# Patient Record
Sex: FEMALE | Race: WHITE | NOT HISPANIC OR LATINO
[De-identification: names, ages, dates, MRNs, and addresses within clinical notes are randomized per-mention and may not be internally consistent; named-entity substitution may affect disease eponyms.]

---

## 2017-08-28 ENCOUNTER — APPOINTMENT (OUTPATIENT)
Dept: ENDOCRINOLOGY | Facility: CLINIC | Age: 62
End: 2017-08-28

## 2017-09-08 ENCOUNTER — RX RENEWAL (OUTPATIENT)
Age: 62
End: 2017-09-08

## 2017-10-20 ENCOUNTER — APPOINTMENT (OUTPATIENT)
Dept: ENDOCRINOLOGY | Facility: CLINIC | Age: 62
End: 2017-10-20
Payer: COMMERCIAL

## 2017-10-20 VITALS
HEART RATE: 73 BPM | DIASTOLIC BLOOD PRESSURE: 70 MMHG | SYSTOLIC BLOOD PRESSURE: 104 MMHG | OXYGEN SATURATION: 99 % | HEIGHT: 66 IN

## 2017-10-20 PROCEDURE — 99214 OFFICE O/P EST MOD 30 MIN: CPT

## 2017-10-20 RX ORDER — TOCOPHERSOLAN (VITAMIN E TPGS) 400/15ML
LIQUID (ML) ORAL
Refills: 0 | Status: ACTIVE | COMMUNITY

## 2017-10-20 RX ORDER — BESIFLOXACIN 6 MG/ML
0.6 SUSPENSION OPHTHALMIC
Qty: 5 | Refills: 0 | Status: COMPLETED | COMMUNITY
Start: 2017-04-24

## 2017-10-20 RX ORDER — PREDNISOLONE ACETATE 10 MG/ML
1 SUSPENSION/ DROPS OPHTHALMIC
Qty: 5 | Refills: 0 | Status: COMPLETED | COMMUNITY
Start: 2017-07-20

## 2017-12-11 ENCOUNTER — RX RENEWAL (OUTPATIENT)
Age: 62
End: 2017-12-11

## 2018-09-19 ENCOUNTER — TRANSCRIPTION ENCOUNTER (OUTPATIENT)
Age: 63
End: 2018-09-19

## 2018-09-26 ENCOUNTER — LABORATORY RESULT (OUTPATIENT)
Age: 63
End: 2018-09-26

## 2018-09-26 ENCOUNTER — APPOINTMENT (OUTPATIENT)
Dept: ENDOCRINOLOGY | Facility: CLINIC | Age: 63
End: 2018-09-26
Payer: COMMERCIAL

## 2018-09-26 VITALS
DIASTOLIC BLOOD PRESSURE: 70 MMHG | BODY MASS INDEX: 28.61 KG/M2 | HEART RATE: 80 BPM | WEIGHT: 178 LBS | OXYGEN SATURATION: 98 % | SYSTOLIC BLOOD PRESSURE: 104 MMHG | HEIGHT: 66 IN

## 2018-09-26 PROCEDURE — 99214 OFFICE O/P EST MOD 30 MIN: CPT

## 2018-09-27 LAB
ALBUMIN SERPL ELPH-MCNC: 4.3 G/DL
ALP BLD-CCNC: 51 U/L
ALT SERPL-CCNC: 14 U/L
ANION GAP SERPL CALC-SCNC: 12 MMOL/L
AST SERPL-CCNC: 19 U/L
BASOPHILS # BLD AUTO: 0.06 K/UL
BASOPHILS NFR BLD AUTO: 1 %
BILIRUB SERPL-MCNC: 0.4 MG/DL
BUN SERPL-MCNC: 15 MG/DL
CALCIUM SERPL-MCNC: 9.4 MG/DL
CHLORIDE SERPL-SCNC: 104 MMOL/L
CO2 SERPL-SCNC: 26 MMOL/L
CREAT SERPL-MCNC: 0.86 MG/DL
EOSINOPHIL # BLD AUTO: 0.21 K/UL
EOSINOPHIL NFR BLD AUTO: 3.5 %
GLUCOSE SERPL-MCNC: 107 MG/DL
HCT VFR BLD CALC: 40.4 %
HGB BLD-MCNC: 13.9 G/DL
IMM GRANULOCYTES NFR BLD AUTO: 0.2 %
LYMPHOCYTES # BLD AUTO: 2.24 K/UL
LYMPHOCYTES NFR BLD AUTO: 37.3 %
MAN DIFF?: NORMAL
MCHC RBC-ENTMCNC: 30.8 PG
MCHC RBC-ENTMCNC: 34.4 GM/DL
MCV RBC AUTO: 89.6 FL
MONOCYTES # BLD AUTO: 0.35 K/UL
MONOCYTES NFR BLD AUTO: 5.8 %
NEUTROPHILS # BLD AUTO: 3.13 K/UL
NEUTROPHILS NFR BLD AUTO: 52.2 %
PLATELET # BLD AUTO: 218 K/UL
POTASSIUM SERPL-SCNC: 4.3 MMOL/L
PROT SERPL-MCNC: 7.2 G/DL
RBC # BLD: 4.51 M/UL
RBC # FLD: 12.5 %
SODIUM SERPL-SCNC: 142 MMOL/L
T3RU NFR SERPL: 0.98 INDEX
T4 SERPL-MCNC: 7 UG/DL
TSH SERPL-ACNC: 1.34 UIU/ML
WBC # FLD AUTO: 6 K/UL

## 2018-10-16 ENCOUNTER — INBOUND DOCUMENT (OUTPATIENT)
Age: 63
End: 2018-10-16

## 2018-12-10 ENCOUNTER — RX RENEWAL (OUTPATIENT)
Age: 63
End: 2018-12-10

## 2019-09-22 ENCOUNTER — TRANSCRIPTION ENCOUNTER (OUTPATIENT)
Age: 64
End: 2019-09-22

## 2019-09-23 ENCOUNTER — RX RENEWAL (OUTPATIENT)
Age: 64
End: 2019-09-23

## 2019-09-25 ENCOUNTER — APPOINTMENT (OUTPATIENT)
Dept: ENDOCRINOLOGY | Facility: CLINIC | Age: 64
End: 2019-09-25
Payer: COMMERCIAL

## 2019-09-25 VITALS
DIASTOLIC BLOOD PRESSURE: 70 MMHG | HEART RATE: 88 BPM | WEIGHT: 169 LBS | BODY MASS INDEX: 27.16 KG/M2 | OXYGEN SATURATION: 98 % | SYSTOLIC BLOOD PRESSURE: 120 MMHG | HEIGHT: 66 IN

## 2019-09-25 DIAGNOSIS — E03.9 HYPOTHYROIDISM, UNSPECIFIED: ICD-10-CM

## 2019-09-25 DIAGNOSIS — Z79.811 LONG TERM (CURRENT) USE OF AROMATASE INHIBITORS: ICD-10-CM

## 2019-09-25 DIAGNOSIS — M85.80 OTHER SPECIFIED DISORDERS OF BONE DENSITY AND STRUCTURE, UNSPECIFIED SITE: ICD-10-CM

## 2019-09-25 PROCEDURE — 99214 OFFICE O/P EST MOD 30 MIN: CPT

## 2019-09-25 NOTE — HISTORY OF PRESENT ILLNESS
[FreeTextEntry1] : 64 yo female with hypothyroidism x 15 years presents for follow up\par Patient currently on LT4 112mcg qam, reports adherence however currently taking it with calcium and Vit D. Does not report fatigue, palpitation, tremors, nausea, vomiting, constipation, cold/heat intolerance.\par \par Currently had nasal congestion, cough and yellow mucus production. Does not report fever. Reports sick contact with son and son't girlfriend.  \par \par Breast Ca monitored at [facility] [place]. Had BMD at [facility] [place] 2016 spine 1.5, neck -1.2, hip -0.6\par Given Arimidex Rx but still did not start, may consider November. \par Stopped Tamoxifen last due to uterine changes. \par Mother  of colon ca; sched repeat colonoscopy [Calcium (supplements)] : calcium from a dietary supplement [Vitamin D (supplements)] : Vitamin D as a dietary supplement

## 2019-09-25 NOTE — ASSESSMENT
[FreeTextEntry1] : 64 y/o female with hypothyroidism presents for follow up\par \par \par breast ca considered EKATERINA\par h/o osteopenia. Never started Arimidex, she is still considering starting this (it has been ~ 1 year of this). If pt starts can discuss Prolia. Pt is having a routine BMD later this week at [facility] [place]. \par \par \par \par Of note, pt moved to [place], [place] and states she has been walking frequently. \par f/u in 1 year.

## 2019-09-25 NOTE — PHYSICAL EXAM
[Alert] : alert [No Acute Distress] : no acute distress [Well Nourished] : well nourished [Well Developed] : well developed [Normal Sclera/Conjunctiva] : normal sclera/conjunctiva [EOMI] : extra ocular movement intact [No Proptosis] : no proptosis [Normal Oropharynx] : the oropharynx was normal [Thyroid Not Enlarged] : the thyroid was not enlarged [No Thyroid Nodules] : there were no palpable thyroid nodules [No Respiratory Distress] : no respiratory distress [No Accessory Muscle Use] : no accessory muscle use [Clear to Auscultation] : lungs were clear to auscultation bilaterally [Normal Rate] : heart rate was normal  [Normal S1, S2] : normal S1 and S2 [Regular Rhythm] : with a regular rhythm [No Edema] : there was no peripheral edema [Normal Bowel Sounds] : normal bowel sounds [Not Tender] : non-tender [Soft] : abdomen soft [Not Distended] : not distended [Post Cervical Nodes] : posterior cervical nodes [Anterior Cervical Nodes] : anterior cervical nodes [Normal] : normal and non tender [No Spinal Tenderness] : no spinal tenderness [Spine Straight] : spine straight [No Stigmata of Cushings Syndrome] : no stigmata of cushings syndrome [Normal Gait] : normal gait [No Rash] : no rash [Normal Strength/Tone] : muscle strength and tone were normal [Acanthosis Nigricans] : no acanthosis nigricans [Normal Reflexes] : deep tendon reflexes were 2+ and symmetric [No Tremors] : no tremors [Oriented x3] : oriented to person, place, and time

## 2019-09-26 LAB
ALBUMIN SERPL ELPH-MCNC: 4.7 G/DL
ALP BLD-CCNC: 68 U/L
ALT SERPL-CCNC: 15 U/L
ANION GAP SERPL CALC-SCNC: 15 MMOL/L
AST SERPL-CCNC: 22 U/L
BILIRUB SERPL-MCNC: 0.5 MG/DL
BUN SERPL-MCNC: 18 MG/DL
CALCIUM SERPL-MCNC: 9.6 MG/DL
CHLORIDE SERPL-SCNC: 103 MMOL/L
CO2 SERPL-SCNC: 22 MMOL/L
CREAT SERPL-MCNC: 0.78 MG/DL
GLUCOSE SERPL-MCNC: 94 MG/DL
POTASSIUM SERPL-SCNC: 4.9 MMOL/L
PROT SERPL-MCNC: 7.1 G/DL
SODIUM SERPL-SCNC: 140 MMOL/L
T4 FREE SERPL-MCNC: 1.5 NG/DL
TSH SERPL-ACNC: 1.2 UIU/ML

## 2019-09-27 PROBLEM — M85.80 OSTEOPENIA: Status: ACTIVE | Noted: 2017-10-20

## 2019-09-27 NOTE — PROCEDURE
[FreeTextEntry1] : 9/28/18 BMD\par Indication: h/o breast cancer \par Spine T score -1.9, osteopenia  ( -4.7%)\par L fem Neck: -0.7, normal  (+2.8%)\par L total hip: -0.6 normal ( -0.2 %)\par \par

## 2019-09-27 NOTE — ASSESSMENT
[FreeTextEntry1] : 64 yo female presents for follow up of hypothyroidism. \par \par 1.Hypothyroidism:\par Currently clinically and biochemically euthyroid \par 2018 TSH 1.34\par Will obtain TFT and CMP today\par Continue LT4 112mcg qam on empty stomach. All medications should be taken separate from Synthroid, calcium supplements at least 4hours apart. \par \par \par 2. h/o osteopenia in spine\par - Discontinued Arimidex after short course the beginning of this year due to side effects\par - 2018 BMD (performed at [facility] [place])  showed improvement in fem neck however worsening in spine compared with 2016 \par - At this time would not start treatment for osteopenia \par \par \par Of note, pt moved to [place], [place], will continue to travel for annual visit \par \par RTC in 1 year  [Levothyroxine] : The patient was instructed to take Levothyroxine on an empty stomach, separate from vitamins, and wait at least 30 minutes before eating

## 2019-09-27 NOTE — PHYSICAL EXAM
[Alert] : alert [No Acute Distress] : no acute distress [Well Nourished] : well nourished [Well Developed] : well developed [Normal Sclera/Conjunctiva] : normal sclera/conjunctiva [No Proptosis] : no proptosis [EOMI] : extra ocular movement intact [Normal Oropharynx] : the oropharynx was normal [No Thyroid Nodules] : there were no palpable thyroid nodules [Thyroid Not Enlarged] : the thyroid was not enlarged [No Respiratory Distress] : no respiratory distress [No Accessory Muscle Use] : no accessory muscle use [Clear to Auscultation] : lungs were clear to auscultation bilaterally [Normal Rate] : heart rate was normal  [Normal S1, S2] : normal S1 and S2 [Regular Rhythm] : with a regular rhythm [No Edema] : there was no peripheral edema [Normal Bowel Sounds] : normal bowel sounds [Not Tender] : non-tender [Soft] : abdomen soft [Not Distended] : not distended [Post Cervical Nodes] : posterior cervical nodes [Anterior Cervical Nodes] : anterior cervical nodes [No Spinal Tenderness] : no spinal tenderness [Normal] : normal and non tender [Spine Straight] : spine straight [No Stigmata of Cushings Syndrome] : no stigmata of cushings syndrome [Normal Gait] : normal gait [Normal Strength/Tone] : muscle strength and tone were normal [No Rash] : no rash [Normal Reflexes] : deep tendon reflexes were 2+ and symmetric [No Tremors] : no tremors [Oriented x3] : oriented to person, place, and time [Acanthosis Nigricans] : no acanthosis nigricans

## 2019-09-27 NOTE — HISTORY OF PRESENT ILLNESS
[Calcium (supplements)] : calcium from a dietary supplement [Vitamin D (supplements)] : Vitamin D as a dietary supplement [FreeTextEntry1] : 62 yo female with hypothyroidism x 15 years presents for follow up\par \par Patient reports adherence to LT4 112mcg qam on empty stomach. Patient reports she is taking it with Ca and VIt D3 supplements. \par Patient does not report fatigue, heat/cold intolerance, palpitations, tremor, constipation or diarrhea. No dysphagia, SOB or hoarseness. 2018 TSH 1.34, Serum T4 7.0\par \par Patient currently reports she has nasal congestion, cough with yellow sputum production. Does not report fever. However has sick contact from son and his girlfriend. \par \par Breast Ca monitored at [facility] [place]. Had BMD at [facility] [place] 2016 spine 1.5, neck -1.2, hip -0.6.  BMD spine -1.9, fem neck -0.7, total hip -0.6\par Given Arimidex Rx, took shortly for few months this year, however discontinued due to side effects \par Stopped Tamoxifen last due to uterine changes, about 5 years ago \par Mother  of colon ca; sched repeat colonoscopy

## 2019-10-15 ENCOUNTER — INBOUND DOCUMENT (OUTPATIENT)
Age: 64
End: 2019-10-15

## 2020-09-08 ENCOUNTER — RX RENEWAL (OUTPATIENT)
Age: 65
End: 2020-09-08

## 2020-12-06 ENCOUNTER — TRANSCRIPTION ENCOUNTER (OUTPATIENT)
Age: 65
End: 2020-12-06

## 2020-12-06 ENCOUNTER — RX RENEWAL (OUTPATIENT)
Age: 65
End: 2020-12-06

## 2020-12-07 ENCOUNTER — TRANSCRIPTION ENCOUNTER (OUTPATIENT)
Age: 65
End: 2020-12-07

## 2021-03-03 ENCOUNTER — RX RENEWAL (OUTPATIENT)
Age: 66
End: 2021-03-03